# Patient Record
Sex: MALE | Race: WHITE | Employment: FULL TIME | URBAN - METROPOLITAN AREA
[De-identification: names, ages, dates, MRNs, and addresses within clinical notes are randomized per-mention and may not be internally consistent; named-entity substitution may affect disease eponyms.]

---

## 2017-01-25 PROBLEM — R00.2 PALPITATIONS: Status: ACTIVE | Noted: 2017-01-25

## 2019-07-23 PROBLEM — S76.312A HAMSTRING STRAIN, LEFT, INITIAL ENCOUNTER: Status: ACTIVE | Noted: 2019-07-23

## 2019-08-15 PROBLEM — I82.442 ACUTE DEEP VEIN THROMBOSIS (DVT) OF TIBIAL VEIN OF LEFT LOWER EXTREMITY (HCC): Status: ACTIVE | Noted: 2019-08-15

## 2019-08-19 PROBLEM — M77.32 CALCANEAL SPUR OF FOOT, LEFT: Status: ACTIVE | Noted: 2019-08-19

## 2019-08-23 PROBLEM — Z87.39 HISTORY OF GOUT: Status: ACTIVE | Noted: 2019-08-23

## 2020-03-11 PROBLEM — I82.4Y2 ACUTE DEEP VEIN THROMBOSIS (DVT) OF PROXIMAL VEIN OF LEFT LOWER EXTREMITY (HCC): Status: ACTIVE | Noted: 2019-10-29

## 2020-03-11 PROBLEM — M10.9 GOUT OF LEFT FOOT: Status: ACTIVE | Noted: 2019-10-29

## 2020-03-11 PROBLEM — E79.0 HYPERURICEMIA: Status: ACTIVE | Noted: 2019-10-29

## 2020-05-19 ENCOUNTER — OFFICE VISIT (OUTPATIENT)
Dept: SURGERY | Facility: CLINIC | Age: 60
End: 2020-05-19
Payer: COMMERCIAL

## 2020-05-19 VITALS
HEIGHT: 71 IN | HEART RATE: 52 BPM | WEIGHT: 173 LBS | TEMPERATURE: 98 F | BODY MASS INDEX: 24.22 KG/M2 | DIASTOLIC BLOOD PRESSURE: 81 MMHG | SYSTOLIC BLOOD PRESSURE: 124 MMHG

## 2020-05-19 DIAGNOSIS — K82.8 BILIARY DYSKINESIA: Primary | ICD-10-CM

## 2020-05-19 DIAGNOSIS — Z98.890 HISTORY OF UMBILICAL HERNIA REPAIR: ICD-10-CM

## 2020-05-19 DIAGNOSIS — Z87.19 HISTORY OF UMBILICAL HERNIA REPAIR: ICD-10-CM

## 2020-05-19 PROCEDURE — 3074F SYST BP LT 130 MM HG: CPT | Performed by: COLON & RECTAL SURGERY

## 2020-05-19 PROCEDURE — 3079F DIAST BP 80-89 MM HG: CPT | Performed by: COLON & RECTAL SURGERY

## 2020-05-19 PROCEDURE — 99244 OFF/OP CNSLTJ NEW/EST MOD 40: CPT | Performed by: COLON & RECTAL SURGERY

## 2020-05-19 PROCEDURE — 3008F BODY MASS INDEX DOCD: CPT | Performed by: COLON & RECTAL SURGERY

## 2020-05-19 RX ORDER — SUCRALFATE 1 G/1
TABLET ORAL
COMMUNITY
Start: 2020-04-02 | End: 2021-02-24 | Stop reason: ALTCHOICE

## 2020-05-19 NOTE — H&P
New Patient Visit Note       Active Problems      1. Biliary dyskinesia    2. History of umbilical hernia repair        Chief Complaint   Patient presents with:  Gallbladder: new patient referred by Dr. Krista Pastor for gallbladder consult.  HIDA scan done 5/7/202 performed all medical decision making. Yary Schilling PA-C    I agree with the note as scribed by the PA  I performed my own physical exam and obtained the history as detailed above.   I have made all appropriate changes in documentation as necessary nondilated biliary system. The gallbladder fills promptly, seen on the 15 minute image, establishing patency of the cystic duct. Activity is seen in proximal small bowel on the 75 minute image, establishing patency of the common bile duct.     The gallbladd in 5 years   • COLONOSCOPY  5 years ago   • HERNIA SURGERY  0414    umbilical hernia repair   • HERNIA SURGERY  5/7486    umbilical herniorrhaphy   • ORAL SURGERY PROCEDURE      wisdom teeth as a teen   • OTHER SURGICAL HISTORY      undescended testicle henderson meals., Disp: 60 tablet, Rfl: 3  •  allopurinol 100 MG Oral Tab, Take 100 mg by mouth daily. , Disp: , Rfl:   •  Pravastatin Sodium 20 MG Oral Tab, Take 1 tablet (20 mg total) by mouth daily. , Disp: 90 tablet, Rfl: 3  •  Ascorbic Acid (VITAMIN C) 1000 MG Or well-developed and well-nourished. No distress. HENT:   Head: Normocephalic and atraumatic. Nose: Nose normal.   Mouth/Throat: Oropharynx is clear and moist.   Eyes: Pupils are equal, round, and reactive to light.  Conjunctivae and EOM are normal. No sc patient presents today in consultation of Dr. Geovany Del Castillo for right upper quadrant pain. The patient states that for about 5 months now he has been developing right upper quadrant pain that will radiate to his back.   He states that it has been worsening over greater than 35 is normal.  It was recommended that he undergo a laparoscopic cholecystectomy with intraoperative cholangiogram.    The patient will be scheduled to undergo a laparoscopic cholecystectomy with intraoperative cholangiogram at [facility]

## 2020-05-19 NOTE — PATIENT INSTRUCTIONS
The patient presents today in consultation of Dr. April Morrison for right upper quadrant pain. The patient states that for about 5 months now he has been developing right upper quadrant pain that will radiate to his back.   He states that it has been worsening as greater than 35 is normal.  It was recommended that he undergo a laparoscopic cholecystectomy with intraoperative cholangiogram.    The patient will be scheduled to undergo a laparoscopic cholecystectomy with intraoperative cholangiogram at [facility]

## 2020-05-20 ENCOUNTER — TELEPHONE (OUTPATIENT)
Dept: SURGERY | Facility: CLINIC | Age: 60
End: 2020-05-20

## 2020-05-20 DIAGNOSIS — K80.18 CALCULUS OF GALLBLADDER WITH OTHER CHOLECYSTITIS WITHOUT OBSTRUCTION: Primary | ICD-10-CM

## 2020-05-23 NOTE — PROGRESS NOTES
Geno Redmond, patient is scheduled for gallbladder surgery with Dr. Latia Acuna on July 10. You can schedule his colonoscopy before it. Otherwise, it should be at least 4 weeks after his surgery.

## 2020-06-10 ENCOUNTER — TELEPHONE (OUTPATIENT)
Dept: SURGERY | Facility: CLINIC | Age: 60
End: 2020-06-10

## 2020-06-10 DIAGNOSIS — K82.8 BILIARY DYSKINESIA: Primary | ICD-10-CM

## 2020-06-11 ENCOUNTER — LAB ENCOUNTER (OUTPATIENT)
Dept: LAB | Facility: HOSPITAL | Age: 60
End: 2020-06-11
Attending: INTERNAL MEDICINE
Payer: COMMERCIAL

## 2020-06-11 DIAGNOSIS — Z01.818 PRE-OP TESTING: ICD-10-CM

## 2020-06-13 PROBLEM — K64.8 INTERNAL HEMORRHOIDS: Status: ACTIVE | Noted: 2020-06-13

## 2020-06-13 PROBLEM — Z80.0 FAMILY HISTORY OF COLON CANCER: Status: ACTIVE | Noted: 2020-06-13

## 2020-06-13 PROBLEM — Z86.010 PERSONAL HISTORY OF COLONIC POLYPS: Status: ACTIVE | Noted: 2020-06-13

## (undated) NOTE — LETTER
20    Patient: Asher Toth  : 1960 Visit date: 2020    Dear  Dr. Ashwini Anderson MD,    Thank you for referring Asher Toth to my practice. Please find my assessment and plan below.            Assessment   Biliary dyskinesia  (primary en sounds are normal activity normal pitch. There  is no rebounding tenderness or guarding. There are no signs of ascites or peritonitis. The liver and spleen are nonpalpable. There are no palpable masses or hernias. Body mass index is 24.13 kg/m².      I